# Patient Record
Sex: MALE | Race: BLACK OR AFRICAN AMERICAN | Employment: UNEMPLOYED | URBAN - METROPOLITAN AREA
[De-identification: names, ages, dates, MRNs, and addresses within clinical notes are randomized per-mention and may not be internally consistent; named-entity substitution may affect disease eponyms.]

---

## 2020-02-15 ENCOUNTER — HOSPITAL ENCOUNTER (EMERGENCY)
Facility: CLINIC | Age: 40
Discharge: HOME | End: 2020-02-15
Attending: EMERGENCY MEDICINE | Admitting: EMERGENCY MEDICINE

## 2020-02-15 ENCOUNTER — APPOINTMENT (OUTPATIENT)
Dept: GENERAL RADIOLOGY | Facility: CLINIC | Age: 40
End: 2020-02-15
Attending: EMERGENCY MEDICINE

## 2020-02-15 VITALS
OXYGEN SATURATION: 95 % | RESPIRATION RATE: 14 BRPM | DIASTOLIC BLOOD PRESSURE: 96 MMHG | SYSTOLIC BLOOD PRESSURE: 163 MMHG | TEMPERATURE: 98.3 F | HEART RATE: 73 BPM

## 2020-02-15 DIAGNOSIS — M79.645 THUMB PAIN, LEFT: ICD-10-CM
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
## 2020-02-15 DIAGNOSIS — S05.12XA TRAUMATIC HEMATOMA OF LEFT ORBIT, INITIAL ENCOUNTER: ICD-10-CM

## 2020-02-15 PROCEDURE — 96360 HYDRATION IV INFUSION INIT: CPT | Performed by: EMERGENCY MEDICINE

## 2020-02-15 PROCEDURE — 73130 X-RAY EXAM OF HAND: CPT | Mod: LT

## 2020-02-15 PROCEDURE — 25800030 ZZH RX IP 258 OP 636: Performed by: EMERGENCY MEDICINE

## 2020-02-15 PROCEDURE — 25000132 ZZH RX MED GY IP 250 OP 250 PS 637: Performed by: EMERGENCY MEDICINE

## 2020-02-15 PROCEDURE — 99284 EMERGENCY DEPT VISIT MOD MDM: CPT | Mod: Z6 | Performed by: EMERGENCY MEDICINE

## 2020-02-15 PROCEDURE — 96361 HYDRATE IV INFUSION ADD-ON: CPT | Performed by: EMERGENCY MEDICINE

## 2020-02-15 PROCEDURE — 99283 EMERGENCY DEPT VISIT LOW MDM: CPT | Mod: 25 | Performed by: EMERGENCY MEDICINE

## 2020-02-15 RX ORDER — OXYCODONE HYDROCHLORIDE 5 MG/1
5 TABLET ORAL ONCE
Status: COMPLETED | OUTPATIENT
Start: 2020-02-15 | End: 2020-02-15

## 2020-02-15 RX ORDER — HYDROCODONE BITARTRATE AND ACETAMINOPHEN 5; 325 MG/1; MG/1
1 TABLET ORAL EVERY 6 HOURS PRN
COMMUNITY

## 2020-02-15 RX ORDER — OXYCODONE HYDROCHLORIDE 5 MG/1
5 TABLET ORAL EVERY 6 HOURS PRN
Qty: 10 TABLET | Refills: 0 | Status: SHIPPED | OUTPATIENT
Start: 2020-02-15

## 2020-02-15 RX ADMIN — OXYCODONE HYDROCHLORIDE 5 MG: 5 TABLET ORAL at 08:25

## 2020-02-15 RX ADMIN — SODIUM CHLORIDE 1000 ML: 900 INJECTION, SOLUTION INTRAVENOUS at 06:10

## 2020-02-15 ASSESSMENT — ENCOUNTER SYMPTOMS
ARTHRALGIAS: 1
FEVER: 0
ABDOMINAL PAIN: 0
EYE PAIN: 1
SHORTNESS OF BREATH: 0

## 2020-02-15 NOTE — DISCHARGE INSTRUCTIONS
Please make an appointment to follow up with [facility] (phone: [phone]) in 7-10 days for recheck. Follow up with your primary care physician for ongoing care.
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
## 2020-02-15 NOTE — ED PROVIDER NOTES
Patient was signed out to me at shift change at 7 AM.  He had been transferred here from [facility] for ophthalmology consult.  Ophthalmology saw the patient.  His initial intraocular pressure was 25.  They checked it again an hour and a half later and it is stable.  Ophthalmology feels comfortable sending the patient home since it is now been over 6 hours since his injury and his intraocular pressure is stable.  They recommended ice packs as needed and follow-up with ophthalmology in clinic in 1 to 2 weeks.  Patient has been given oral oxycodone for pain.  He will be given a prescription as well for pain due to the left eye retrobulbar hematoma and periorbital contusion.  He was prescribed oxycodone, 5 mg tablets with 10 tablets and no refills.    X-ray of the left hand was done as well.  It is negative.    Results for orders placed or performed during the hospital encounter of 02/15/20   XR Hand Left G/E 3 Views    Impression    Impression: No acute bony fracture or dislocation/subluxation.           Swapna Mace MD  02/15/20 0920

## 2020-02-15 NOTE — ED AVS SNAPSHOT
[facility], [place], Emergency Department  [address] [place] [id number]  Phone:  [phone]                                    Mick Yu   MRN: [id number]    Department:  [facility], Emergency Department   Date of Visit:  2/15/2020           After Visit Summary Signature Page    I have received my discharge instructions, and my questions have been answered. I have discussed any challenges I see with this plan with the nurse or doctor.    ..........................................................................................................................................  Patient/Patient Representative Signature      ..........................................................................................................................................  Patient Representative Print Name and Relationship to Patient    ..................................................               ................................................  Date                                   Time    ..........................................................................................................................................  Reviewed by Signature/Title    ...................................................              ..............................................  Date                                               Time          22EPIC Rev 08/18
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
## 2021-06-16 PROBLEM — J45.909 ASTHMA: Status: ACTIVE | Noted: 2020-02-15
